# Patient Record
Sex: MALE | Race: WHITE
[De-identification: names, ages, dates, MRNs, and addresses within clinical notes are randomized per-mention and may not be internally consistent; named-entity substitution may affect disease eponyms.]

---

## 2019-07-29 ENCOUNTER — HOSPITAL ENCOUNTER (INPATIENT)
Dept: HOSPITAL 4 - SMU | Age: 79
LOS: 3 days | Discharge: HOME HEALTH | DRG: 415 | End: 2019-08-01
Attending: SURGERY | Admitting: SURGERY
Payer: COMMERCIAL

## 2019-07-29 VITALS — SYSTOLIC BLOOD PRESSURE: 113 MMHG

## 2019-07-29 VITALS — SYSTOLIC BLOOD PRESSURE: 99 MMHG

## 2019-07-29 VITALS — BODY MASS INDEX: 34.32 KG/M2 | WEIGHT: 206 LBS | HEIGHT: 65 IN

## 2019-07-29 VITALS — SYSTOLIC BLOOD PRESSURE: 116 MMHG

## 2019-07-29 DIAGNOSIS — K57.92: ICD-10-CM

## 2019-07-29 DIAGNOSIS — E66.01: ICD-10-CM

## 2019-07-29 DIAGNOSIS — E78.5: ICD-10-CM

## 2019-07-29 DIAGNOSIS — E11.9: ICD-10-CM

## 2019-07-29 DIAGNOSIS — R65.10: ICD-10-CM

## 2019-07-29 DIAGNOSIS — I10: ICD-10-CM

## 2019-07-29 DIAGNOSIS — K80.12: Primary | ICD-10-CM

## 2019-07-29 DIAGNOSIS — K82.8: ICD-10-CM

## 2019-07-29 LAB
ANION GAP SERPL CALCULATED.3IONS-SCNC: 7 MMOL/L (ref 5–15)
BUN SERPL-MCNC: 13 MG/DL (ref 8–21)
CALCIUM SERPL-MCNC: 8.8 MG/DL (ref 8.4–11)
CHLORIDE SERPL-SCNC: 100 MMOL/L (ref 98–107)
CREAT SERPL-MCNC: 0.85 MG/DL (ref 0.55–1.3)
GFR SERPL CREATININE-BSD FRML MDRD: (no result) ML/MIN (ref 90–?)
GLUCOSE SERPL-MCNC: 289 MG/DL (ref 70–99)
HCT VFR BLD AUTO: 46 % (ref 36–54)
HGB BLD-MCNC: 15.7 G/DL (ref 14–18)
POTASSIUM SERPL-SCNC: 3.7 MMOL/L (ref 3.5–5.1)
SODIUM SERPLBLD-SCNC: 134 MMOL/L (ref 136–145)

## 2019-07-29 PROCEDURE — C1758 CATHETER, URETERAL: HCPCS

## 2019-07-29 PROCEDURE — G0378 HOSPITAL OBSERVATION PER HR: HCPCS

## 2019-07-29 PROCEDURE — 0FN40ZZ RELEASE GALLBLADDER, OPEN APPROACH: ICD-10-PCS | Performed by: SURGERY

## 2019-07-29 PROCEDURE — 0FJ44ZZ INSPECTION OF GALLBLADDER, PERCUTANEOUS ENDOSCOPIC APPROACH: ICD-10-PCS | Performed by: SURGERY

## 2019-07-29 PROCEDURE — 0FT40ZZ RESECTION OF GALLBLADDER, OPEN APPROACH: ICD-10-PCS | Performed by: SURGERY

## 2019-07-29 PROCEDURE — C1727 CATH, BAL TIS DIS, NON-VAS: HCPCS

## 2019-07-29 RX ADMIN — CEFAZOLIN SODIUM SCH MLS/HR: 1 INJECTION, SOLUTION INTRAVENOUS at 15:33

## 2019-07-29 RX ADMIN — DEXTROSE AND SODIUM CHLORIDE SCH MLS/HR: 5; 450 INJECTION, SOLUTION INTRAVENOUS at 22:22

## 2019-07-29 RX ADMIN — FAMOTIDINE SCH MG: 10 INJECTION INTRAVENOUS at 22:21

## 2019-07-29 RX ADMIN — CEFAZOLIN SODIUM SCH MLS/HR: 1 INJECTION, SOLUTION INTRAVENOUS at 23:10

## 2019-07-29 RX ADMIN — HUMAN INSULIN PRN UNITS: 100 INJECTION, SOLUTION SUBCUTANEOUS at 22:29

## 2019-07-29 RX ADMIN — Medication SCH EA: at 22:26

## 2019-07-29 RX ADMIN — DEXTROSE AND SODIUM CHLORIDE SCH MLS/HR: 5; 450 INJECTION, SOLUTION INTRAVENOUS at 12:31

## 2019-07-29 RX ADMIN — HYDROCODONE BITARTRATE AND ACETAMINOPHEN PRN TAB: 5; 325 TABLET ORAL at 13:01

## 2019-07-30 VITALS — SYSTOLIC BLOOD PRESSURE: 100 MMHG

## 2019-07-30 VITALS — SYSTOLIC BLOOD PRESSURE: 99 MMHG

## 2019-07-30 VITALS — SYSTOLIC BLOOD PRESSURE: 102 MMHG

## 2019-07-30 VITALS — SYSTOLIC BLOOD PRESSURE: 105 MMHG

## 2019-07-30 VITALS — SYSTOLIC BLOOD PRESSURE: 107 MMHG

## 2019-07-30 VITALS — SYSTOLIC BLOOD PRESSURE: 112 MMHG

## 2019-07-30 LAB
ALBUMIN SERPL BCP-MCNC: 2.7 G/DL (ref 3.4–4.8)
ALT SERPL W P-5'-P-CCNC: 50 U/L (ref 12–78)
ANION GAP SERPL CALCULATED.3IONS-SCNC: 7 MMOL/L (ref 5–15)
AST SERPL W P-5'-P-CCNC: 50 U/L (ref 10–37)
BASOPHILS # BLD AUTO: 0 K/UL (ref 0–0.2)
BASOPHILS NFR BLD AUTO: 0.2 % (ref 0–2)
BILIRUB SERPL-MCNC: 1.1 MG/DL (ref 0–1)
BUN SERPL-MCNC: 18 MG/DL (ref 8–21)
CALCIUM SERPL-MCNC: 8.3 MG/DL (ref 8.4–11)
CHLORIDE SERPL-SCNC: 98 MMOL/L (ref 98–107)
CREAT SERPL-MCNC: 1.04 MG/DL (ref 0.55–1.3)
EOSINOPHIL # BLD AUTO: 0.1 K/UL (ref 0–0.4)
EOSINOPHIL NFR BLD AUTO: 0.4 % (ref 0–4)
ERYTHROCYTE [DISTWIDTH] IN BLOOD BY AUTOMATED COUNT: 13.6 % (ref 9–15)
GFR SERPL CREATININE-BSD FRML MDRD: (no result) ML/MIN (ref 90–?)
GLUCOSE SERPL-MCNC: 262 MG/DL (ref 70–99)
HCT VFR BLD AUTO: 43 % (ref 36–54)
HGB BLD-MCNC: 14.4 G/DL (ref 14–18)
LYMPHOCYTES # BLD AUTO: 2 K/UL (ref 1–5.5)
LYMPHOCYTES NFR BLD AUTO: 14.7 % (ref 20.5–51.5)
MCH RBC QN AUTO: 30 PG (ref 27–31)
MCHC RBC AUTO-ENTMCNC: 33 % (ref 32–36)
MCV RBC AUTO: 89 FL (ref 79–98)
MONOCYTES # BLD MANUAL: 1.3 K/UL (ref 0–1)
MONOCYTES # BLD MANUAL: 9.6 % (ref 1.7–9.3)
NEUTROPHILS # BLD AUTO: 10.2 K/UL (ref 1.8–7.7)
NEUTROPHILS NFR BLD AUTO: 75.1 % (ref 40–70)
PLATELET # BLD AUTO: 183 K/UL (ref 130–430)
POTASSIUM SERPL-SCNC: 3.5 MMOL/L (ref 3.5–5.1)
RBC # BLD AUTO: 4.82 MIL/UL (ref 4.2–6.2)
SODIUM SERPLBLD-SCNC: 132 MMOL/L (ref 136–145)
WBC # BLD AUTO: 13.6 K/UL (ref 4.8–10.8)

## 2019-07-30 RX ADMIN — Medication SCH EA: at 06:12

## 2019-07-30 RX ADMIN — PREGABALIN SCH MG: 75 CAPSULE ORAL at 21:12

## 2019-07-30 RX ADMIN — Medication SCH EA: at 12:02

## 2019-07-30 RX ADMIN — ENOXAPARIN SODIUM SCH MG: 30 INJECTION SUBCUTANEOUS at 09:46

## 2019-07-30 RX ADMIN — SIMVASTATIN SCH MG: 40 TABLET, FILM COATED ORAL at 21:12

## 2019-07-30 RX ADMIN — Medication SCH EA: at 17:57

## 2019-07-30 RX ADMIN — HUMAN INSULIN PRN UNITS: 100 INJECTION, SOLUTION SUBCUTANEOUS at 12:04

## 2019-07-30 RX ADMIN — FAMOTIDINE SCH MG: 10 INJECTION INTRAVENOUS at 21:12

## 2019-07-30 RX ADMIN — Medication SCH EA: at 21:24

## 2019-07-30 RX ADMIN — HUMAN INSULIN PRN UNITS: 100 INJECTION, SOLUTION SUBCUTANEOUS at 21:26

## 2019-07-30 RX ADMIN — FAMOTIDINE SCH MG: 10 INJECTION INTRAVENOUS at 09:44

## 2019-07-30 RX ADMIN — DEXTROSE AND SODIUM CHLORIDE SCH MLS/HR: 5; 450 INJECTION, SOLUTION INTRAVENOUS at 06:10

## 2019-07-30 RX ADMIN — HYDROCODONE BITARTRATE AND ACETAMINOPHEN PRN TAB: 5; 325 TABLET ORAL at 16:03

## 2019-07-30 RX ADMIN — HUMAN INSULIN PRN UNITS: 100 INJECTION, SOLUTION SUBCUTANEOUS at 06:14

## 2019-07-30 RX ADMIN — DEXTROSE AND SODIUM CHLORIDE SCH MLS/HR: 5; 450 INJECTION, SOLUTION INTRAVENOUS at 17:51

## 2019-07-30 RX ADMIN — HUMAN INSULIN PRN UNITS: 100 INJECTION, SOLUTION SUBCUTANEOUS at 18:00

## 2019-07-31 VITALS — SYSTOLIC BLOOD PRESSURE: 133 MMHG

## 2019-07-31 VITALS — SYSTOLIC BLOOD PRESSURE: 127 MMHG

## 2019-07-31 VITALS — SYSTOLIC BLOOD PRESSURE: 105 MMHG

## 2019-07-31 VITALS — SYSTOLIC BLOOD PRESSURE: 123 MMHG

## 2019-07-31 VITALS — SYSTOLIC BLOOD PRESSURE: 115 MMHG

## 2019-07-31 LAB
ALBUMIN SERPL BCP-MCNC: 2.4 G/DL (ref 3.4–4.8)
ALT SERPL W P-5'-P-CCNC: 36 U/L (ref 12–78)
ANION GAP SERPL CALCULATED.3IONS-SCNC: 9 MMOL/L (ref 5–15)
AST SERPL W P-5'-P-CCNC: 31 U/L (ref 10–37)
BASOPHILS # BLD AUTO: 0.1 K/UL (ref 0–0.2)
BASOPHILS NFR BLD AUTO: 0.7 % (ref 0–2)
BILIRUB SERPL-MCNC: 0.9 MG/DL (ref 0–1)
BUN SERPL-MCNC: 11 MG/DL (ref 8–21)
CALCIUM SERPL-MCNC: 8 MG/DL (ref 8.4–11)
CHLORIDE SERPL-SCNC: 100 MMOL/L (ref 98–107)
CREAT SERPL-MCNC: 0.75 MG/DL (ref 0.55–1.3)
EOSINOPHIL # BLD AUTO: 0 K/UL (ref 0–0.4)
EOSINOPHIL NFR BLD AUTO: 0.3 % (ref 0–4)
ERYTHROCYTE [DISTWIDTH] IN BLOOD BY AUTOMATED COUNT: 13.3 % (ref 9–15)
GFR SERPL CREATININE-BSD FRML MDRD: (no result) ML/MIN (ref 90–?)
GLUCOSE SERPL-MCNC: 206 MG/DL (ref 70–99)
HCT VFR BLD AUTO: 40.9 % (ref 36–54)
HGB BLD-MCNC: 14.1 G/DL (ref 14–18)
LYMPHOCYTES # BLD AUTO: 1.4 K/UL (ref 1–5.5)
LYMPHOCYTES NFR BLD AUTO: 8.9 % (ref 20.5–51.5)
MCH RBC QN AUTO: 30 PG (ref 27–31)
MCHC RBC AUTO-ENTMCNC: 35 % (ref 32–36)
MCV RBC AUTO: 88 FL (ref 79–98)
MONOCYTES # BLD MANUAL: 0.8 K/UL (ref 0–1)
MONOCYTES # BLD MANUAL: 5.2 % (ref 1.7–9.3)
NEUTROPHILS # BLD AUTO: 13 K/UL (ref 1.8–7.7)
NEUTROPHILS NFR BLD AUTO: 84.9 % (ref 40–70)
PLATELET # BLD AUTO: 189 K/UL (ref 130–430)
POTASSIUM SERPL-SCNC: 3.7 MMOL/L (ref 3.5–5.1)
RBC # BLD AUTO: 4.65 MIL/UL (ref 4.2–6.2)
SODIUM SERPLBLD-SCNC: 136 MMOL/L (ref 136–145)
WBC # BLD AUTO: 15.3 K/UL (ref 4.8–10.8)

## 2019-07-31 RX ADMIN — PREGABALIN SCH MG: 75 CAPSULE ORAL at 08:10

## 2019-07-31 RX ADMIN — DEXTROSE AND SODIUM CHLORIDE SCH MLS/HR: 5; 450 INJECTION, SOLUTION INTRAVENOUS at 04:57

## 2019-07-31 RX ADMIN — HUMAN INSULIN PRN UNITS: 100 INJECTION, SOLUTION SUBCUTANEOUS at 11:12

## 2019-07-31 RX ADMIN — FAMOTIDINE SCH MG: 10 INJECTION INTRAVENOUS at 22:37

## 2019-07-31 RX ADMIN — Medication SCH EA: at 06:30

## 2019-07-31 RX ADMIN — Medication SCH EA: at 22:39

## 2019-07-31 RX ADMIN — FAMOTIDINE SCH MG: 10 INJECTION INTRAVENOUS at 08:10

## 2019-07-31 RX ADMIN — ENOXAPARIN SODIUM SCH MG: 30 INJECTION SUBCUTANEOUS at 08:13

## 2019-07-31 RX ADMIN — LISINOPRIL SCH MG: 20 TABLET ORAL at 08:10

## 2019-07-31 RX ADMIN — SIMVASTATIN SCH MG: 40 TABLET, FILM COATED ORAL at 22:37

## 2019-07-31 RX ADMIN — HUMAN INSULIN PRN UNITS: 100 INJECTION, SOLUTION SUBCUTANEOUS at 17:06

## 2019-07-31 RX ADMIN — PREGABALIN SCH MG: 75 CAPSULE ORAL at 22:37

## 2019-07-31 RX ADMIN — Medication SCH EA: at 17:04

## 2019-07-31 RX ADMIN — HYDROCHLOROTHIAZIDE SCH MG: 25 TABLET ORAL at 08:09

## 2019-07-31 RX ADMIN — Medication SCH EA: at 11:09

## 2019-07-31 RX ADMIN — HUMAN INSULIN PRN UNITS: 100 INJECTION, SOLUTION SUBCUTANEOUS at 06:33

## 2019-07-31 RX ADMIN — HUMAN INSULIN PRN UNITS: 100 INJECTION, SOLUTION SUBCUTANEOUS at 22:40

## 2019-07-31 RX ADMIN — DEXTROSE AND SODIUM CHLORIDE SCH MLS/HR: 5; 450 INJECTION, SOLUTION INTRAVENOUS at 14:43

## 2019-08-01 VITALS — SYSTOLIC BLOOD PRESSURE: 126 MMHG

## 2019-08-01 VITALS — SYSTOLIC BLOOD PRESSURE: 116 MMHG

## 2019-08-01 RX ADMIN — HUMAN INSULIN PRN UNITS: 100 INJECTION, SOLUTION SUBCUTANEOUS at 06:33

## 2019-08-01 RX ADMIN — ENOXAPARIN SODIUM SCH MG: 30 INJECTION SUBCUTANEOUS at 08:47

## 2019-08-01 RX ADMIN — DEXTROSE AND SODIUM CHLORIDE SCH MLS/HR: 5; 450 INJECTION, SOLUTION INTRAVENOUS at 08:48

## 2019-08-01 RX ADMIN — HUMAN INSULIN PRN UNITS: 100 INJECTION, SOLUTION SUBCUTANEOUS at 11:20

## 2019-08-01 RX ADMIN — PREGABALIN SCH MG: 75 CAPSULE ORAL at 08:45

## 2019-08-01 RX ADMIN — HYDROCHLOROTHIAZIDE SCH MG: 25 TABLET ORAL at 08:45

## 2019-08-01 RX ADMIN — LISINOPRIL SCH MG: 20 TABLET ORAL at 08:45

## 2019-08-01 RX ADMIN — FAMOTIDINE SCH MG: 10 INJECTION INTRAVENOUS at 08:44

## 2019-08-01 RX ADMIN — DEXTROSE AND SODIUM CHLORIDE SCH MLS/HR: 5; 450 INJECTION, SOLUTION INTRAVENOUS at 01:42

## 2019-08-01 RX ADMIN — Medication SCH EA: at 06:30

## 2019-08-01 RX ADMIN — Medication SCH EA: at 11:19

## 2019-10-24 ENCOUNTER — OFFICE (OUTPATIENT)
Dept: URBAN - METROPOLITAN AREA CLINIC 13 | Facility: CLINIC | Age: 79
End: 2019-10-24

## 2019-10-24 VITALS
HEART RATE: 78 BPM | SYSTOLIC BLOOD PRESSURE: 93 MMHG | HEIGHT: 64 IN | WEIGHT: 201 LBS | DIASTOLIC BLOOD PRESSURE: 58 MMHG

## 2019-10-24 DIAGNOSIS — K80.50 COMMON BILE DUCT CALCULUS: ICD-10-CM

## 2019-10-24 PROCEDURE — 99213 OFFICE O/P EST LOW 20 MIN: CPT | Performed by: INTERNAL MEDICINE

## 2019-10-24 NOTE — SERVICEHPINOTES
Mr. Braden comes in accompanied by his daughter, Yesica. He is asymptomatic. I saw him at [facility] and performed an ERCP with sphincterotomy and common bile duct stone extraction followed by choledochoscopy which confirmed clearance of stones. He did not notice dark urine since the procedure, did not notice melena, and did not complain of abdominal pain. He is being followed at [facility] by hepatobiliary surgery and tells me that they are planning a repeat MRI since his cholecystectomy was incomplete at [facility].BR